# Patient Record
Sex: FEMALE | Race: WHITE | NOT HISPANIC OR LATINO | URBAN - METROPOLITAN AREA
[De-identification: names, ages, dates, MRNs, and addresses within clinical notes are randomized per-mention and may not be internally consistent; named-entity substitution may affect disease eponyms.]

---

## 2017-01-24 ENCOUNTER — ALLSCRIPTS OFFICE VISIT (OUTPATIENT)
Dept: OTHER | Facility: OTHER | Age: 47
End: 2017-01-24

## 2017-11-21 ENCOUNTER — GENERIC CONVERSION - ENCOUNTER (OUTPATIENT)
Dept: OTHER | Facility: OTHER | Age: 47
End: 2017-11-21

## 2017-11-21 ENCOUNTER — ALLSCRIPTS OFFICE VISIT (OUTPATIENT)
Dept: OTHER | Facility: OTHER | Age: 47
End: 2017-11-21

## 2017-12-12 ENCOUNTER — GENERIC CONVERSION - ENCOUNTER (OUTPATIENT)
Dept: OTHER | Facility: OTHER | Age: 47
End: 2017-12-12

## 2018-01-02 ENCOUNTER — GENERIC CONVERSION - ENCOUNTER (OUTPATIENT)
Dept: OTHER | Facility: OTHER | Age: 48
End: 2018-01-02

## 2018-01-02 ENCOUNTER — ALLSCRIPTS OFFICE VISIT (OUTPATIENT)
Dept: OTHER | Facility: OTHER | Age: 48
End: 2018-01-02

## 2018-01-22 VITALS
WEIGHT: 160 LBS | DIASTOLIC BLOOD PRESSURE: 88 MMHG | SYSTOLIC BLOOD PRESSURE: 118 MMHG | BODY MASS INDEX: 26.66 KG/M2 | HEIGHT: 65 IN

## 2018-01-22 VITALS
DIASTOLIC BLOOD PRESSURE: 78 MMHG | SYSTOLIC BLOOD PRESSURE: 116 MMHG | BODY MASS INDEX: 28.49 KG/M2 | HEIGHT: 65 IN | WEIGHT: 171 LBS

## 2018-01-24 VITALS
BODY MASS INDEX: 27.82 KG/M2 | SYSTOLIC BLOOD PRESSURE: 106 MMHG | HEIGHT: 65 IN | DIASTOLIC BLOOD PRESSURE: 80 MMHG | WEIGHT: 167 LBS

## 2018-01-24 VITALS
BODY MASS INDEX: 28.99 KG/M2 | HEIGHT: 65 IN | WEIGHT: 174 LBS | SYSTOLIC BLOOD PRESSURE: 110 MMHG | DIASTOLIC BLOOD PRESSURE: 62 MMHG

## 2018-03-07 NOTE — PROGRESS NOTES
Plan    1  Laryngoscopy, flexible fiberoptic; diagnostic - POC; Status:Active; Requested   SUM:[id number];     Assessment    1  Chronic rhinitis (472 0) (J31 0)   2  Nasal septal perforation (478 19) (J34 89)   3  Hoarseness of voice (784 42) (R49 0)   4  Migraine variant, intractable (346 21) (G43 819)    Chief Complaint  Chief Complaint Free Text Note Form: F/u previous sinus infections, but has current nose infection  History of Present Illness  HPI: Ms Carley Lopez presents today as a follow up due to frequent sinus infections  Symptoms include left side clear to bloody rhinorrhea, with facial pain on left side  Expectorates thick mucus from the left with cough and post nasal drip  She feels mucus gets stuck in her throat at times  She is concerned about migraine like headaches, no current neurology treatment  Tried Netti pot with no improvement  Currently treated with oral antibiotics, nasal ointment, and oral steroids  She stated the left side sinuses feels like it is blocked  She has completed MRI and CT scans completed in [place] and was informed there was nothing wrong  Prior sinus surgery unsure as to age when completed  No otalgia or otorrhea  Review of Systems  Complete ENT ROS [facility]:   Eyes: itching of the eyes and vision change  Ears: No complaints of hearing loss, discharge, imbalance, recent ear infections, or tinnitus  Nose: discharge or runny nose, epistaxis and loss of smell  Mouth: No sores in mouth, no altered taste, no dental problems  Throat: throat pain and hoarseness  Neck: soreness, neck pain and lump or swelling in the neck  Genitourinary: No complaints of dysuria, flank pain or frequent urination  Cardiovascular: No complaints of chest pain or palpitations  Respiratory: No complaints of shortness of breath, cough or wheezing  Gastrointestinal: No complaints of heartburn, nausea/vomiting, or constipation     Neurological: No complaints of headache, convulsions or memory loss  Constitutional: No fever, feels well, no tiredness  Psychiatric: No anxiety, no depression, no sleep disturbances  Musculoskeletal: No complaints of arthralgias, myalgias or limb pain  Integumentary: No complaints of skin rash, itching or skin lesions  Endocrine: No complaints of proptosis, muscle weakness or feelings of weakness  Hematologic/Lymphatic: No complaints of swollen glands in the neck, does not bleed easily, does not bruise easily  ROS Reviewed:   ROS reviewed  Active Problems    1  Chronic rhinitis (472 0) (J31 0)   2  Nasal septal perforation (478 19) (J34 89)   3  Sinus infection (473 9) (J32 9)    Past Medical History    1  History of Environmental allergies (V15 09) (Z91 09)   2  History of esophageal reflux (V12 79) (Z87 19)   3  History of psychiatric care (V11 9) (Z92 89)  Past Medical History Reviewed: The past medical history was reviewed and updated today  Surgical History  Surgical History Reviewed: The surgical history was reviewed and updated today  Family History  Family History    1  Adopted (V68 89) (Z02 82)  Family History Reviewed: The family history was reviewed and updated today  Social History    · Disabled   · Former smoker (L58 23) (V46 842)   · No alcohol use   · Single   · Unemployed (V62 0) (Z56 0)  Social History Reviewed: The social history was reviewed and is unchanged  Allergies    1  Demerol TABS    Vitals  Signs   Recorded: [id number] 96:45XE   Systolic: 180, LUE, Sitting  Diastolic: 78, LUE, Sitting  Height: 5 ft 5 in  Weight: 171 lb   BMI Calculated: 28 46  BSA Calculated: 1 85    Physical Exam    Constitutional:   General appearance: Well developed, well nourished  Ability to communicate: Voice normal  Speech normal    Head and Face:   Head and face: Head normocephalic, atraumatic with no lesions or palpable masses  Submandibular glands and parotid glands: non tender, no masses     Eyes:   Test of Ocular Motility: Gaze normal  No nystagmus  Ears:   Otoscopic Examination: Tympanic membranes intact and normal in appearance, no retraction of tympanic membranes observed, no serous effusion observed, no evidence of tympanosclerosis  Hearing: Normal    Nose:   External auditory canals: No cerumen impaction noted, no drainage observed, no edema noted in EAC, no exostoses present, no osteoma present, no tenderness noted  External Inspection of Nose: No deformities observed, no deviation of bone structure, no skin lesion present, no swelling present  Nares are symmetric, no deviation of caudal portion of septum  Nasal mucosa, septum, and turbinates: Abnormal  There was clear rhinorrhea from both nares  examination of the septum showed a ~Ucm perforation, deviation to the left unable to visualize due to gag reflex  Mouth: Inspection of Lips, Teeth, Gums: Lips normal in color, moist, no cracks or lesions  No loose teeth, no missing teeth  Gingiva: no bleeding observed, no inflammation present  Hard Palate: no asymmetry observed, no torus present  Soft palate normal with no ulcers noted  Throat:   Examination of Oropharynx: Oral Mucosa: no masses, lesions, leukoplakia, or scarring  Normal Salo's ducts, pink and moist, no discoloration noted  Floor of mouth: normal Warthin's ducts, no lesions, ulcerations, leukoplakia or torus mandibularis  Tonsils: no hypertrophy or ulcerations noted  Tongue: normal mobility, surfaces without fissures, leukoplakia, ulceration or masses, not enlarged, no pallor noted, no white patches present  unable to visualize due to gag reflex  Neck:   Examination of Neck: No decreased range of motion, trachea midline  Examination of Thyroid: Normal size, non-tender, no palpable masses  Pulmonary:   Respiratory effort: Normal respiratory rate and rhythm, no increased work of breathing     Cardiovascular:   Inspection of Peripheral vascular system by observation: Normal  Lymphatic:   Palpation of Lymph Nodes: Neck: No generalized lymphadenopathy  Neurological/Psychiatric:   Cranial nerves II-VII grossly intact  Oriented to person, place, and time  Cooperative, in no acute distress  Procedure    Procedure: flexible fiberoptic laryngoscopy  Nasal Endoscopy Indication: anterior anatomical abnormalities precluding visualization by anterior rhinoscopy  Laryngoscopy Indication: hoarseness not clearly evaluated by indirect laryngoscopy  Risks, benefits and alternatives were discussed with the patient  Written consent was obtained prior to the procedure and is detailed in the patient's record  Prior to the start of the procedure, a time out was taken and the identity of the patient was confirmed via name and date of birth with the patient  The correct site and the procedure to be performed were confirmed and the site marked as appropriate  The correct side was confirmed if applicable  The positioning of the patient was verified  The availability of the correct equipment was verified  Procedure Note:     Anesthesia: 2% lidocaine and oxymetazoline HCl  With the patient seated in the exam chair, the nasal cavity was intubated with the flexible laryngoscope  Laryngoscopy Findings: Laryngoscopy was successfully completed using the flexible laryngoscope and the nasopharynx, hypopharynx, and larynx were examined  Laryngoscopy Findings: Nasal Mucosa Findings: dry, but the nasal mucosa was normal  Nasopharynx Findings: the nasopharynx was normal  Adenoids Findings: normal  Eustachian Tube Ostium Findings: normal  Choanae Findings: normal  Posterior Nasopharynx Findings: normal  Hypopharynx Findings: normal, normal pharyngeal walls, normal base of the tongue, normal pyriform sinuses without saliva pooling and normal vallecula   Larynx Findings: normal  Epiglottis Findings: the epiglottis was regular without inflammation, lesions or masses, with regular aryepiglottic folds, and a smooth petiolus  Glottis Findings: the false vocal folds were pink and regular, the ventricular sulcus was open, the true vocal folds were glistening white, tense and of equal length, mobility, and height  Posterior Cricoid Findings: posterior cricoid area had healthy pink mucosa in the interarytenoid area and the esophageal inlet  Post-Procedure:   the patient tolerated the procedure well and the vital signs were stable  Complications: there were no complications  Discussion/Summary  Discussion Summary:   Ms Leann Mcclellan presents today as a follow up due to chronic rhinitis, hoarseness, migraine headaches, and septal perforation  Offered options for treatment today including laryngoscopy and she agreed  Laryngoscopy today indicated prior surgery including right antrostomy, ethmoidectomy, and part turb resection, widely patent and on the left partial inferior turbinectomy, ethmoidectomy, sphenoidotomy and septal perforation  Other treatment options include surgical interventions (might not be effective), nasal steroids (do not recommend due to septal perforation), oral steroids, saline rinses, and repeat imaging  Discussed treatment of septal perforation including keeping the site moistened with saline rinses and antibiotic ointment to q-tip to the site  Given instructions on application of ointment to the septal perforation and use of saline irrigation  Potential to refer to neurology for migraine headaches  Suggest obtain records from prior imaging and to bring results and imaging to her next visit  Agreed to hold Flonase, continue saline rinses, use antibiotic ointment, and obtain results CT scan [facility]  f/u when obtains CT scan to evaluate  Goals and Barriers: The patient has the current Goals: Monitor sinus symptoms  Patient's Capacity to Self-Care: Patient is able to Self-Care  Medication SE Review and Pt Understands Tx:  The treatment plan was reviewed with the patient/guardian   The patient/guardian understands and agrees with the treatment plan      Signatures   Electronically signed by : ANNA Lora ; Nov 21 2017  2:37PM EST                       (Author)

## 2018-03-07 NOTE — CONSULTS
Plan    1  Nasal endoscopy, diagnostic, unilateral/bilateral - POC; Status:Complete;   Done:   [id number]    Assessment    1  Nasal septal perforation (858 19) (J34 89)   2  Chronic rhinitis (472 0) (J31 0)    Chief Complaint  Chief Complaint Free Text Note Form: Pt presents with sinus infections for a year      History of Present Illness  HPI: Francisco Guzman presents today as a new patient due to frequent sinus infections  Symptoms include left side clear to bloody rhinorrhea, with facial pain on left side  Expectorates thick mucus from the left  Tried Netti pot with no improvement  No additional treatments  She stated the left side feels like it is blocked  She has completed MRI and CT scans completed in [place] and was informed there was nothing wrong  Prior sinus surgery unsure as to age when completed  No otalgia or otorrhea  Review of Systems  Complete ENT ROS [facility]:   Eyes: itching of the eyes and vision change  Ears: No complaints of hearing loss, discharge, imbalance, recent ear infections, or tinnitus  Nose: discharge or runny nose, epistaxis and loss of smell  Mouth: No sores in mouth, no altered taste, no dental problems  Throat: throat pain and hoarseness  Neck: soreness, neck pain and lump or swelling in the neck  Genitourinary: No complaints of dysuria, flank pain or frequent urination  Cardiovascular: No complaints of chest pain or palpitations  Respiratory: No complaints of shortness of breath, cough or wheezing  Gastrointestinal: No complaints of heartburn, nausea/vomiting, or constipation  Neurological: No complaints of headache, convulsions or memory loss  Constitutional: No fever, feels well, no tiredness  Psychiatric: No anxiety, no depression, no sleep disturbances  Musculoskeletal: No complaints of arthralgias, myalgias or limb pain  Integumentary: No complaints of skin rash, itching or skin lesions     Endocrine: No complaints of proptosis, muscle weakness or feelings of weakness  Hematologic/Lymphatic: No complaints of swollen glands in the neck, does not bleed easily, does not bruise easily  ROS Reviewed:   ROS reviewed  Past Medical History    1  History of Environmental allergies (V15 09) (Z91 09)   2  History of esophageal reflux (V12 79) (Z87 19)   3  History of psychiatric care (V11 9) (Z92 89)  Past Medical History Reviewed: The past medical history was reviewed and updated today  Surgical History  Surgical History Reviewed: The surgical history was reviewed and updated today  Family History  Family History    1  Adopted (V68 89) (Z02 82)  Family History Reviewed: The family history was reviewed and updated today  Social History    · Disabled   · Former smoker (D22 65) (F23 971)   · No alcohol use   · Single   · Unemployed (V62 0) (Z56 0)  Social History Reviewed: The social history was reviewed and updated today  Allergies    1  Demerol TABS    Vitals  Signs   Recorded: [id number] 05:72GU   Systolic: 332, RUE, Sitting  Diastolic: 88, RUE, Sitting  Height: 5 ft 5 in  Weight: 160 lb   BMI Calculated: 26 63  BSA Calculated: 1 8    Physical Exam    Constitutional:   General appearance: Well developed, well nourished  Ability to communicate: Voice normal  Speech normal    Head and Face:   Head and face: Head normocephalic, atraumatic with no lesions or palpable masses  Submandibular glands and parotid glands: non tender, no masses  Eyes:   Test of Ocular Motility: Gaze normal  No nystagmus  Ears:   Otoscopic Examination: Tympanic membranes intact and normal in appearance, no retraction of tympanic membranes observed, no serous effusion observed, no evidence of tympanosclerosis  Hearing: Normal    Nose:   External auditory canals: No cerumen impaction noted, no drainage observed, no edema noted in EAC, no exostoses present, no osteoma present, no tenderness noted   External Inspection of Nose: No deformities observed, no deviation of bone structure, no skin lesion present, no swelling present  Nares are symmetric, no deviation of caudal portion of septum  Nasal mucosa, septum, and turbinates: Abnormal  There was clear rhinorrhea from both nares  examination of the septum showed a perforation, deviation to the left unable to visualize due to gag reflex  Mouth: Inspection of Lips, Teeth, Gums: Lips normal in color, moist, no cracks or lesions  No loose teeth, no missing teeth  Gingiva: no bleeding observed, no inflammation present  Hard Palate: no asymmetry observed, no torus present  Soft palate normal with no ulcers noted  Throat:   Examination of Oropharynx: Oral Mucosa: no masses, lesions, leukoplakia, or scarring  Normal Salo's ducts, pink and moist, no discoloration noted  Floor of mouth: normal Warthin's ducts, no lesions, ulcerations, leukoplakia or torus mandibularis  Tonsils: no hypertrophy or ulcerations noted  Tongue: normal mobility, surfaces without fissures, leukoplakia, ulceration or masses, not enlarged, no pallor noted, no white patches present  unable to visualize due to gag reflex  Neck:   Examination of Neck: No decreased range of motion, trachea midline  Examination of Thyroid: Normal size, non-tender, no palpable masses  Pulmonary:   Respiratory effort: Normal respiratory rate and rhythm, no increased work of breathing  Cardiovascular:   Inspection of Peripheral vascular system by observation: Normal    Lymphatic:   Palpation of Lymph Nodes: Neck: No generalized lymphadenopathy  Neurological/Psychiatric:   Cranial nerves II-VII grossly intact  Oriented to person, place, and time  Cooperative, in no acute distress  Procedure    Procedure: nasal endoscopy  Nasal Endoscopy Indication: evaluation of epistaxis  Risks were discussed with the patient  Procedure Note:     Anesthesia: 4% Lidocaine and oxymetazoline HCl     Nasal Endoscopy Findings: Nasal endoscopy was successfully completed using the endoscope and the nasal mucosa, septum, turbinates, and osteomeatal complex were examined  Nasal Endoscopy Findings: Nasal mucosa findings: the nasal mucosa was normal  Septum findings: deviated to the left and perforation  Nasopharynx findings: the nasopharynx was normal  Middle Meatus findings: the middle meatus had no abnormalities  Sinuses findings: prior surgical procedures noted  Minimal crusting  Post-Procedure:      Discussion/Summary  Discussion Summary:   Ms Nina Fontana presents today as a new patient due to chronic rhinitis and septal perforation  Offered options for treatment today including nasal endoscopy and she agreed  Nasal endoscopy today indicated prior surgery including right antrostomy, ethmoidectomy, and part turb resection and on the left partial inferior turbinectomy, ethmoidectomy, sphenoidotomy and septal perforation  Other treatment Options include surgical interventions (might not be effective), nasal steroids (do not recommend due to septal perforation), oral steroids, saline rinses, and repeat imaging  Discussed treatment of septal perforation including keeping the site moistened with saline rinses and antibiotic ointment to q-tip tot he site  Given instructions on application of ointment to the septal perforation and use of saline irrigation  Suggest obtain records from prior imaging and to bring results and imaging to her next visit  Agreed to hold Flonase, continue saline rinses, use antibiotic ointment, and obtain CT scan imaging  f/u 3 to 4 weeks  Goals and Barriers: The patient has the current Goals: Improve nasal airflow and congestion  Patient's Capacity to Self-Care: Patient is able to Self-Care  Transitional Care: Continuing care needed for: Nasal congestion and rhintis        Signatures   Electronically signed by : ANNA Hancock ; Jan 31 2017  4:56PM EST                       (Author)

## 2018-03-07 NOTE — PROGRESS NOTES
Plan    1  [address] MD, [facility] Neurology Co-Management  *  Status: Active  Requested for:   [id number]  Care Summary provided  : Yes    2  Nasal endoscopy, diagnostic, unilateral/bilateral - POC; Status:Active - Perform Order;   Requested FHL:[id number]; Assessment    1  Chronic rhinitis (472 0) (J31 0)   2  Nasal septal perforation (478 19) (J34 89)   3  Hoarseness of voice (784 42) (R49 0)   4  Migraine variant, intractable (346 21) (G43 819)    Chief Complaint  Chief Complaint Free Text Note Form: Patient here to discuss CT  Disc brought with patient  Active Problems    1  Chronic rhinitis (472 0) (J31 0)   2  Hoarseness of voice (784 42) (R49 0)   3  Migraine variant, intractable (346 21) (G43 819)   4  Nasal septal perforation (478 19) (J34 89)   5  Sinus infection (473 9) (J32 9)    Past Medical History    1  History of Environmental allergies (V15 09) (Z91 09)   2  History of esophageal reflux (V12 79) (Z87 19)   3  History of psychiatric care (V11 9) (Z92 89)    Family History  Family History    1  Adopted (V68 89) (Z02 82)    Social History    · Disabled   · Former smoker (V15 82) (I83 815)   · No alcohol use   · Single   · Unemployed (V62 0) (Z56 0)    Current Meds   1  Ambien 10 MG Oral Tablet; Therapy: (Recorded:02Jan2018) to Recorded   2  Amitriptyline HCl - 25 MG Oral Tablet; Therapy: (Recorded:02Jan2018) to Recorded   3  Daily Multiple Vitamins TABS; Therapy: ((08) 9316 1484) to Recorded   4  DiazePAM 5 MG Oral Tablet; Therapy: ((08) 9316 1484) to Recorded   5  Neurontin 600 MG Oral Tablet; Therapy: (Recorded:12Dec2017) to Recorded   6  RaNITidine HCl - 300 MG Oral Tablet; Therapy: ((08) 9316 1484) to Recorded   7  Savella 100 MG Oral Tablet; Therapy: (Recorded:12Dec2017) to Recorded   8  Singulair 10 MG Oral Tablet; Therapy: ((08) 9316 1484) to Recorded   9  Topamax 25 MG Oral Tablet; Therapy: (([phone] 4738 4822) to Recorded   10   ValACYclovir HCl - 1 GM Oral Tablet; Therapy: (Recorded:02Jan2018) to Recorded    Allergies    1   Demerol TABS    Vitals  Signs   Recorded: [id number] 50:57TB   Systolic: 537  Diastolic: 80  Height: 5 ft 5 in  Weight: 167 lb   BMI Calculated: 27 79  BSA Calculated: 1 83    Signatures   Electronically signed by : ANNA Minaya ; Jan 2 2018  5:00PM EST                       (Author)
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
